# Patient Record
Sex: OTHER/UNKNOWN | Race: BLACK OR AFRICAN AMERICAN | Employment: FULL TIME | URBAN - METROPOLITAN AREA
[De-identification: names, ages, dates, MRNs, and addresses within clinical notes are randomized per-mention and may not be internally consistent; named-entity substitution may affect disease eponyms.]

---

## 2019-10-03 ENCOUNTER — OFFICE VISIT (OUTPATIENT)
Dept: FAMILY MEDICINE CLINIC | Age: 24
End: 2019-10-03

## 2019-10-03 VITALS
HEART RATE: 88 BPM | TEMPERATURE: 98.2 F | DIASTOLIC BLOOD PRESSURE: 72 MMHG | BODY MASS INDEX: 33.46 KG/M2 | HEIGHT: 67 IN | OXYGEN SATURATION: 98 % | RESPIRATION RATE: 18 BRPM | SYSTOLIC BLOOD PRESSURE: 112 MMHG | WEIGHT: 213.2 LBS

## 2019-10-03 DIAGNOSIS — F32.A ANXIETY AND DEPRESSION: ICD-10-CM

## 2019-10-03 DIAGNOSIS — Z76.89 ENCOUNTER TO ESTABLISH CARE WITH NEW DOCTOR: Primary | ICD-10-CM

## 2019-10-03 DIAGNOSIS — F41.9 ANXIETY AND DEPRESSION: ICD-10-CM

## 2019-10-03 DIAGNOSIS — F33.2 SEVERE EPISODE OF RECURRENT MAJOR DEPRESSIVE DISORDER, WITHOUT PSYCHOTIC FEATURES (HCC): ICD-10-CM

## 2019-10-03 DIAGNOSIS — T70.20XA: ICD-10-CM

## 2019-10-03 DIAGNOSIS — R06.2 WHEEZING: ICD-10-CM

## 2019-10-03 RX ORDER — SERTRALINE HYDROCHLORIDE 25 MG/1
TABLET, FILM COATED ORAL DAILY
COMMUNITY
Start: 2019-09-19 | End: 2019-10-03 | Stop reason: SDUPTHER

## 2019-10-03 RX ORDER — SULFAMETHOXAZOLE AND TRIMETHOPRIM 800; 160 MG/1; MG/1
1 TABLET ORAL 2 TIMES DAILY
COMMUNITY
Start: 2019-09-29 | End: 2019-11-14 | Stop reason: ALTCHOICE

## 2019-10-03 RX ORDER — ALBUTEROL SULFATE 90 UG/1
1 AEROSOL, METERED RESPIRATORY (INHALATION)
Qty: 1 INHALER | Refills: 0 | Status: SHIPPED | OUTPATIENT
Start: 2019-10-03 | End: 2019-10-04

## 2019-10-03 RX ORDER — SERTRALINE HYDROCHLORIDE 50 MG/1
50 TABLET, FILM COATED ORAL DAILY
Qty: 90 TAB | Refills: 1 | Status: SHIPPED | OUTPATIENT
Start: 2019-10-03 | End: 2019-11-14 | Stop reason: SDUPTHER

## 2019-10-03 NOTE — PROGRESS NOTES
Patient Name: Alexandre Cintron   MRN: <[id number]>    Nandini Ledesma is a 26 y.o. female who presents with the following: Here to establish care with new PCP. Has been on Zoloft 25 mg daily for both anxiety and depression. States initially helped with depression but she feels the same now. Has not helped her anxiety. Denies any side effects. Denies any active SI/HI. Does not want to start therapy yet. Has noticed a chronic history of feeling short of breath, lightheaded, and wheezing when she exerts herself. She is never been formally diagnosed with asthma. She does recall having breathing issues during childhood when she had URIs. Her mother has asthma. Has had recurrent episodes of nausea and vertigo when she is in high altitude, whether it is flying or hiking in the [facility]. Has tried Dramamine without much effect. Also occasionally has a discomfort along her right ear during the situations. Review of Systems   Constitutional: Negative for chills, fever, malaise/fatigue and weight loss. HENT: Negative for hearing loss, nosebleeds and sore throat. Respiratory: Positive for shortness of breath and wheezing. Negative for cough, hemoptysis and sputum production. Cardiovascular: Negative for chest pain, palpitations, leg swelling and PND. Gastrointestinal: Negative for abdominal pain, blood in stool, constipation, diarrhea, nausea and vomiting. Genitourinary: Negative for dysuria, frequency and urgency. Musculoskeletal: Negative for back pain, falls, joint pain, myalgias and neck pain. Skin: Negative for itching and rash. Neurological: Negative for dizziness, sensory change, focal weakness and loss of consciousness. Psychiatric/Behavioral: Positive for depression. Negative for suicidal ideas. The patient is nervous/anxious. All other systems reviewed and are negative.       The patient's medications, allergies, past medical history, surgical history, family history and social history were reviewed and updated where appropriate. Prior to Admission medications    Medication Sig Start Date End Date Taking? Authorizing Provider   sertraline (ZOLOFT) 25 mg tablet Take  by mouth daily. 9/19/19  Yes Provider, Historical   trimethoprim-sulfamethoxazole (BACTRIM DS, SEPTRA DS) 160-800 mg per tablet Take 1 Tab by mouth two (2) times a day. 9/29/19  Yes Provider, Historical       Allergies   Allergen Reactions    Penicillins Other (comments)     Not sure about the reaction, it runs in the family          Past Medical History:   Diagnosis Date    Anxiety and depression        History reviewed. No pertinent surgical history.     Family History   Problem Relation Age of Onset    No Known Problems Mother     No Known Problems Father     Breast Cancer Paternal Aunt        Social History     Socioeconomic History    Marital status: SINGLE     Spouse name: Not on file    Number of children: Not on file    Years of education: Not on file    Highest education level: Not on file   Occupational History    Not on file   Social Needs    Financial resource strain: Not on file    Food insecurity:     Worry: Not on file     Inability: Not on file    Transportation needs:     Medical: Not on file     Non-medical: Not on file   Tobacco Use    Smoking status: Never Smoker    Smokeless tobacco: Never Used   Substance and Sexual Activity    Alcohol use: Yes     Comment: about 2 a month    Drug use: Not Currently    Sexual activity: Yes     Partners: Female   Lifestyle    Physical activity:     Days per week: Not on file     Minutes per session: Not on file    Stress: Not on file   Relationships    Social connections:     Talks on phone: Not on file     Gets together: Not on file     Attends Jehovah's witness service: Not on file     Active member of club or organization: Not on file     Attends meetings of clubs or organizations: Not on file     Relationship status: Not on file   Ruben Intimate partner violence:     Fear of current or ex partner: Not on file     Emotionally abused: Not on file     Physically abused: Not on file     Forced sexual activity: Not on file   Other Topics Concern    Not on file   Social History Narrative    Not on file         OBJECTIVE    Visit Vitals  /72 (BP 1 Location: Right arm, BP Patient Position: Sitting)   Pulse 88   Temp 98.2 °F (36.8 °C) (Oral)   Resp 18   Ht 5' 7\" (1.702 m)   Wt 213 lb 3.2 oz (96.7 kg)   LMP 09/04/2019 (Exact Date)   SpO2 98%   BMI 33.39 kg/m²       Physical Exam   Constitutional: She is oriented to person, place, and time and well-developed, well-nourished, and in no distress. No distress. HENT:   Head: Normocephalic and atraumatic. Right Ear: External ear normal. Tympanic membrane is not perforated and not erythematous. No middle ear effusion. No decreased hearing is noted. Left Ear: External ear normal. Tympanic membrane is not perforated and not erythematous. No middle ear effusion. No decreased hearing is noted. Nose: Nose normal. Right sinus exhibits no maxillary sinus tenderness and no frontal sinus tenderness. Left sinus exhibits no maxillary sinus tenderness and no frontal sinus tenderness. Mouth/Throat: Uvula is midline, oropharynx is clear and moist and mucous membranes are normal.   Eyes: Pupils are equal, round, and reactive to light. Conjunctivae and EOM are normal.   Neck: Normal range of motion. Neck supple. Cardiovascular: Normal rate, regular rhythm, normal heart sounds and intact distal pulses. Exam reveals no gallop and no friction rub. No murmur heard. Pulmonary/Chest: Effort normal and breath sounds normal. No respiratory distress. She has no wheezes. She has no rales. Abdominal: Soft. Bowel sounds are normal. She exhibits no distension. There is no tenderness. There is no rebound and no guarding. Lymphadenopathy:     She has no cervical adenopathy.    Neurological: She is alert and oriented to person, place, and time. Skin: Skin is warm and dry. She is not diaphoretic. Psychiatric: Mood, memory, affect and judgment normal.   Nursing note and vitals reviewed. ASSESSMENT AND PLAN  John Guzmán is a 26 y.o. female who presents today for:    1. Encounter to establish care with new doctor    2. Anxiety and depression  Increase Zoloft to 50 mg daily. - sertraline (ZOLOFT) 50 mg tablet; Take 1 Tab by mouth daily. Dispense: 90 Tab; Refill: 1    3. Wheezing  Possible exercise induced asthma. Will trial with albuterol.  - albuterol (PROVENTIL HFA, VENTOLIN HFA, PROAIR HFA) 90 mcg/actuation inhaler; Take 1 Puff by inhalation every six (6) hours as needed for Wheezing. Dispense: 1 Inhaler; Refill: 0    4. Unspecified effects of high altitude, initial encounter  Could try prophylactic Afrin prior to flights/hiking. Medications Discontinued During This Encounter   Medication Reason    sertraline (ZOLOFT) 25 mg tablet Reorder       Follow-up and Dispositions    · Return in about 6 weeks (around 11/14/2019). Medication risks/benefits/costs/interactions/alternatives discussed with patient. Advised patient to call back or return to office if symptoms worsen/change/persist. If patient cannot reach us or should anything more severe/urgent arise he/she should proceed directly to the nearest emergency department. Discussed expected course/resolution/complications of diagnosis in detail with patient. Patient given a written after visit summary which includes his/her diagnoses, current medications and vitals. Patient expressed understanding with the diagnosis and plan. Yvonne New M.D.

## 2019-10-03 NOTE — PROGRESS NOTES
Chief Complaint   Patient presents with    New Patient     has never had a pap    Depression    Anxiety    Asthma     trouble breathing from time to time, has passed out in the passed due to shortness of breath     1. Have you been to the ER, urgent care clinic since your last visit? Hospitalized since your last visit? Yes, patient went to [facility] clinic due to UTI a few days ago. 2. Have you seen or consulted any other health care providers outside of the [address] [place] [address] since your last visit? Include any pap smears or colon screening.  No

## 2019-10-03 NOTE — ASSESSMENT & PLAN NOTE
Stable, based on history, physical exam and review of pertinent labs, studies and medications; meds reconciled; continue current treatment plan. Key Psychotherapeutic Meds             sertraline (ZOLOFT) 50 mg tablet (Taking) Take 1 Tab by mouth daily. Other [address] [place] [address]     The patient is on no other behavioral health meds.         No results found for: NA, NAPOC, CREA, CREAPOC, CREATEXT, TSH, WBC, WBCT, WBCPOC, GPT, ALTPOC, ALT, SGOT, ASTPOC, GGT, LITHM, ATRPA, NORTR, TSHEXT

## 2019-10-03 NOTE — PATIENT INSTRUCTIONS

## 2019-11-14 ENCOUNTER — OFFICE VISIT (OUTPATIENT)
Dept: FAMILY MEDICINE CLINIC | Age: 24
End: 2019-11-14

## 2019-11-14 VITALS
WEIGHT: 216 LBS | RESPIRATION RATE: 16 BRPM | OXYGEN SATURATION: 98 % | BODY MASS INDEX: 33.9 KG/M2 | TEMPERATURE: 98.1 F | DIASTOLIC BLOOD PRESSURE: 68 MMHG | HEIGHT: 67 IN | SYSTOLIC BLOOD PRESSURE: 120 MMHG | HEART RATE: 74 BPM

## 2019-11-14 DIAGNOSIS — F41.9 ANXIETY AND DEPRESSION: ICD-10-CM

## 2019-11-14 DIAGNOSIS — F32.A ANXIETY AND DEPRESSION: ICD-10-CM

## 2019-11-14 RX ORDER — SERTRALINE HYDROCHLORIDE 100 MG/1
100 TABLET, FILM COATED ORAL DAILY
Qty: 60 TAB | Refills: 1 | Status: SHIPPED | OUTPATIENT
Start: 2019-11-14 | End: 2020-03-22 | Stop reason: SDUPTHER

## 2019-11-14 NOTE — PROGRESS NOTES
Patient Name: Adrienne Mathis   MRN: <[id number]>    Evan Carpenter is a 22 y.o. female who presents with the following:     Since last visit, increased Zoloft to 50 mg daily. States that she has not noticed much of an improvement but denies any side effects. Review of Systems   Constitutional: Negative for fever, malaise/fatigue and weight loss. Respiratory: Negative for cough, hemoptysis, shortness of breath and wheezing. Cardiovascular: Negative for chest pain, palpitations, leg swelling and PND. Gastrointestinal: Negative for abdominal pain, constipation, diarrhea, nausea and vomiting. Psychiatric/Behavioral: Positive for depression. Negative for suicidal ideas. The patient is nervous/anxious. The patient's medications, allergies, past medical history, surgical history, family history and social history were reviewed and updated where appropriate. Prior to Admission medications    Medication Sig Start Date End Date Taking? Authorizing Provider   albuterol (PROVENTIL HFA) 90 mcg/actuation inhaler Take 2 Puffs by inhalation every six (6) hours as needed for Wheezing. 10/4/19  Yes Rosina Crystal MD   sertraline (ZOLOFT) 50 mg tablet Take 1 Tab by mouth daily. 10/3/19  Yes Rosina Crystal MD   trimethoprim-sulfamethoxazole (BACTRIM DS, SEPTRA DS) 160-800 mg per tablet Take 1 Tab by mouth two (2) times a day. 9/29/19 11/14/19  Provider, Historical       Allergies   Allergen Reactions    Penicillins Other (comments)     Not sure about the reaction, it runs in the family        OBJECTIVE    Visit Vitals  /68 (BP 1 Location: Right arm, BP Patient Position: Sitting)   Pulse 74   Temp 98.1 °F (36.7 °C) (Oral)   Resp 16   Ht 5' 7\" (1.702 m)   Wt 216 lb (98 kg)   LMP 11/05/2019 (Exact Date)   SpO2 98%   BMI 33.83 kg/m²       Physical Exam   Constitutional: She is oriented to person, place, and time and well-developed, well-nourished, and in no distress. No distress.    Eyes: Pupils are equal, round, and reactive to light. Conjunctivae and EOM are normal.   Musculoskeletal: Normal range of motion. Neurological: She is alert and oriented to person, place, and time. Gait normal.   Skin: Skin is warm and dry. She is not diaphoretic. Psychiatric: Mood, memory, affect and judgment normal.   Nursing note and vitals reviewed. ASSESSMENT AND PLAN  Justus Tavares is a 22 y.o. female who presents today for:    1. Anxiety and depression  Increase to 100 mg daily. - sertraline (ZOLOFT) 100 mg tablet; Take 1 Tab by mouth daily. Dose change  Dispense: 60 Tab; Refill: 1       Medications Discontinued During This Encounter   Medication Reason    trimethoprim-sulfamethoxazole (BACTRIM DS, SEPTRA DS) 160-800 mg per tablet Therapy Completed    sertraline (ZOLOFT) 50 mg tablet Reorder       Follow-up and Dispositions    · Return in about 6 weeks (around 12/26/2019) for Medication Check. Medication risks/benefits/costs/interactions/alternatives discussed with patient. Advised patient to call back or return to office if symptoms worsen/change/persist. If patient cannot reach us or should anything more severe/urgent arise he/she should proceed directly to the nearest emergency department. Discussed expected course/resolution/complications of diagnosis in detail with patient. Patient given a written after visit summary which includes his/her diagnoses, current medications and vitals. Patient expressed understanding with the diagnosis and plan. Yvonne Acosta M.D.

## 2019-11-14 NOTE — PROGRESS NOTES
Chief Complaint   Patient presents with    Anxiety     zoloft - it helped at first but it is not helping anymore     1. Have you been to the ER, urgent care clinic since your last visit? Hospitalized since your last visit? No    2. Have you seen or consulted any other health care providers outside of the [address] [place] [address] since your last visit? Include any pap smears or colon screening.  No

## 2020-03-19 ENCOUNTER — PATIENT MESSAGE (OUTPATIENT)
Dept: FAMILY MEDICINE CLINIC | Age: 25
End: 2020-03-19

## 2020-03-19 NOTE — LETTER
3/20/2020 10:54 AM 
 
Ms. Alma Toure 
[address] [place] [address] To Whom It May Concern: Alma Toure is currently under the care of [facility]. She has a history of asthma. Please consider excusing her from work duties given the current oronavirus outbreak. If there are questions or concerns please have the patient contact our office. Sincerely, Kieran Man MD

## 2020-03-19 NOTE — TELEPHONE ENCOUNTER
From: Zuly Andersen  To: Amanda Malik MD  Sent: 3/19/2020 3:20 PM EDT  [facility] [place] [facility] Manny Bello,     I wanted to see if I could get a doctors note to stay home from work for a couple weeks because of the virus. Currently my job ([employer]) doesnt know if we will be closing but advised anyone that has immune or respiratory issues to stay home. And because of my asthma Im a little concerned. I hope to hear from you soon!      Thanks,   Zuly

## 2020-04-07 ENCOUNTER — DOCUMENTATION ONLY (OUTPATIENT)
Dept: FAMILY MEDICINE CLINIC | Age: 25
End: 2020-04-07

## 2020-04-07 NOTE — PROGRESS NOTES
Called and left a message for this patient regarding setting up a Virtual therapy session. Will await a response back.   Tameka Avalos LCSW
Have You Had Previous Treatments With Pdt Before?: has not had previous treatments
When Outside In The Sun, Do You...: mostly burns, rarely tans

## 2020-08-19 ENCOUNTER — HOSPITAL ENCOUNTER (EMERGENCY)
Age: 25
Discharge: HOME | End: 2020-08-19
Attending: EMERGENCY MEDICINE
Payer: COMMERCIAL

## 2020-08-19 VITALS
HEART RATE: 85 BPM | OXYGEN SATURATION: 100 % | SYSTOLIC BLOOD PRESSURE: 97 MMHG | WEIGHT: 245.15 LBS | BODY MASS INDEX: 38.4 KG/M2 | RESPIRATION RATE: 16 BRPM | TEMPERATURE: 97.8 F | DIASTOLIC BLOOD PRESSURE: 65 MMHG
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
## 2020-08-19 DIAGNOSIS — R04.0 EPISTAXIS, RECURRENT: Primary | ICD-10-CM

## 2020-08-19 DIAGNOSIS — S02.2XXA CLOSED FRACTURE OF NASAL BONE, INITIAL ENCOUNTER: ICD-10-CM

## 2020-08-19 PROCEDURE — 99282 EMERGENCY DEPT VISIT SF MDM: CPT
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
## 2020-12-08 ENCOUNTER — VIRTUAL VISIT (OUTPATIENT)
Dept: FAMILY MEDICINE CLINIC | Age: 25
End: 2020-12-08
Payer: COMMERCIAL

## 2020-12-08 DIAGNOSIS — R41.840 ATTENTION DEFICIT: ICD-10-CM

## 2020-12-08 DIAGNOSIS — F41.9 ANXIETY AND DEPRESSION: Primary | ICD-10-CM

## 2020-12-08 DIAGNOSIS — F32.A ANXIETY AND DEPRESSION: Primary | ICD-10-CM

## 2020-12-08 PROCEDURE — 99213 OFFICE O/P EST LOW 20 MIN: CPT | Performed by: FAMILY MEDICINE

## 2020-12-08 RX ORDER — BUPROPION HYDROCHLORIDE 150 MG/1
150 TABLET ORAL
Qty: 30 TAB | Refills: 2 | Status: SHIPPED | OUTPATIENT
Start: 2020-12-08 | End: 2021-03-01

## 2020-12-08 RX ORDER — SERTRALINE HYDROCHLORIDE 100 MG/1
TABLET, FILM COATED ORAL
Qty: 90 TAB | Refills: 1 | Status: SHIPPED | OUTPATIENT
Start: 2020-12-08

## 2020-12-08 NOTE — PROGRESS NOTES
Yoig Amaya, who was evaluated through a synchronous (real-time) audio-video encounter, and/or her healthcare decision maker, is aware that it is a billable service, with coverage as determined by her insurance carrier. She provided verbal consent to proceed: YES, and patient identification was verified. It was conducted pursuant to the emergency declaration under the [facility], [facility] authority and the [employer] and [employer] Act. A caregiver was present when appropriate. Ability to conduct physical exam was limited. I was at home. The patient was at home. This virtual visit was conducted via [employer]. Pursuant to the emergency declaration under the [facility], [address] [place] [address] authority and the [employer] and [employer] Act, this Virtual  Visit was conducted to reduce the patient's risk of exposure to COVID-19 and provide continuity of care for an established patient. Services were provided through a video synchronous discussion virtually to substitute for in-person clinic visit. Due to this being a TeleHealth evaluation, many elements of the physical examination are unable to be assessed. Total Time: minutes: 5-10 minutes. Dianna Khan MD    089  Subjective:   Yogi Amaya was seen for:    Reports increased anxiety despite Zoloft 100 mg daily. Also reports increased weight, possibly 2 to 3 clothing sizes over few months; attributes to sedentary lifestyle but noticed once she started Zoloft. Has had lifelong issues with attention and concentration. Affected her school so much she had to be home schooled. Is wondering if she has ADHD. Prior to Admission medications    Medication Sig Start Date End Date Taking?  Authorizing Provider   sertraline (ZOLOFT) 100 mg tablet Take 1 tablet by mouth once daily 11/3/20   Inez Reyna MD albuterol (PROVENTIL HFA) 90 mcg/actuation inhaler Take 2 Puffs by inhalation every six (6) hours as needed for Wheezing. 10/4/19   Tremaine Knight MD       Allergies   Allergen Reactions    Penicillins Other (comments)     Not sure about the reaction, it runs in the family        Review of Systems   Constitutional: Negative for fever, malaise/fatigue and weight loss. Respiratory: Negative for cough, hemoptysis, shortness of breath and wheezing. Cardiovascular: Negative for chest pain, palpitations, leg swelling and PND. Gastrointestinal: Negative for abdominal pain, constipation, diarrhea, nausea and vomiting. Psychiatric/Behavioral: The patient is nervous/anxious. Physical Exam:     There were no vitals taken for this visit. General: alert, cooperative, no distress   Mental  status: normal mood, behavior, speech, dress, motor activity, and thought processes, able to follow commands   HENT: NCAT   Neck: no visualized mass   Resp: no respiratory distress   Neuro: no gross deficits   Skin: no discoloration or lesions of concern on visible areas   Psychiatric: normal affect, consistent with stated mood, no evidence of hallucinations       Assessment & Plan:     Buzz Lucas is a 26 y.o. female who presents today for:    1. Anxiety and depression  Will add on Wellbutrin to see if it helps with anxiety/attention.  - buPROPion XL (WELLBUTRIN XL) 150 mg tablet; Take 1 Tab by mouth every morning. Dispense: 30 Tab; Refill: 2  - sertraline (ZOLOFT) 100 mg tablet; Take 1 tablet by mouth once daily  Dispense: 90 Tab; Refill: 1    2. Attention deficit  Trial with Wellbutrin. Give psychiatry resources for neurospych testing/stimulant medication management if needed. Medications Discontinued During This Encounter   Medication Reason    sertraline (ZOLOFT) 100 mg tablet REORDER          Follow-up and Dispositions    · Return in about 6 weeks (around 1/19/2021) for Medication Check.        Treatment risks/benefits/costs/interactions/alternatives discussed with patient. Advised patient to call back or return to office if symptoms worsen/change/persist. If patient cannot reach us or should anything more severe/urgent arise he/she should proceed directly to the nearest emergency department. Discussed expected course/resolution/complications of diagnosis in detail with patient. Patient expressed understanding with the diagnosis and plan. Aivi N. Caleb Severe M.D.

## 2021-01-11 NOTE — TELEPHONE ENCOUNTER
MD Fortino Simons,    Call from [address] [place] [address] requesting 30 d/s of albuterol inhaler for patient. Corinne Beard    Last Visit: VV 12/8/20  MD Fortino Simons  Next Appointment: Not scheduled  Previous Refill Encounter(s): 10/4/19 1    Requested Prescriptions     Pending Prescriptions Disp Refills    albuterol (Proventil HFA) 90 mcg/actuation inhaler 1 Inhaler 1     Sig: Take 2 Puffs by inhalation every six (6) hours as needed for Wheezing.

## 2021-01-12 RX ORDER — ALBUTEROL SULFATE 90 UG/1
2 AEROSOL, METERED RESPIRATORY (INHALATION)
Qty: 1 INHALER | Refills: 1 | Status: SHIPPED | OUTPATIENT
Start: 2021-01-12 | End: 2021-05-10

## 2021-04-09 ENCOUNTER — APPOINTMENT (OUTPATIENT)
Dept: GENERAL RADIOLOGY | Age: 26
End: 2021-04-09
Attending: EMERGENCY MEDICINE
Payer: COMMERCIAL

## 2021-04-09 ENCOUNTER — HOSPITAL ENCOUNTER (EMERGENCY)
Age: 26
Discharge: HOME | End: 2021-04-09
Attending: EMERGENCY MEDICINE
Payer: COMMERCIAL

## 2021-04-09 VITALS
DIASTOLIC BLOOD PRESSURE: 87 MMHG | OXYGEN SATURATION: 97 % | SYSTOLIC BLOOD PRESSURE: 121 MMHG | RESPIRATION RATE: 16 BRPM | HEART RATE: 89 BPM | TEMPERATURE: 97.6 F

## 2021-04-09 DIAGNOSIS — M25.572 ACUTE LEFT ANKLE PAIN: Primary | ICD-10-CM

## 2021-04-09 PROCEDURE — 73610 X-RAY EXAM OF ANKLE: CPT

## 2021-04-09 PROCEDURE — 73630 X-RAY EXAM OF FOOT: CPT

## 2021-04-09 PROCEDURE — 99283 EMERGENCY DEPT VISIT LOW MDM: CPT

## 2021-04-09 NOTE — ED NOTES
Dr. Amy Kong reviewed discharge instructions with the patient. The patient verbalized understanding. Patient is in no apparent distress.

## 2021-04-09 NOTE — ED TRIAGE NOTES
Patient presents to the emergency department reporting left ankle pain. Patient reports she fell a while ago, and got checked out at that time. She arrives with an ankle brace in place. Patient states the pain is \"flaring up\". Patient denies new injury.

## 2021-04-09 NOTE — ED PROVIDER NOTES
HPI       26y F here with diffuse ankle and foot pain. Says she had an injury about 6 months ago. Had to wear an ankle splint that she started to wear again in the past few days due to pain. No new injury. No fever. No rash. No swelling. Pain is diffuse. Took tylenol without relief. Past Medical History:   Diagnosis Date    Anxiety and depression     Asthma        History reviewed. No pertinent surgical history.       Family History:   Problem Relation Age of Onset    No Known Problems Mother     No Known Problems Father     Breast Cancer Paternal Aunt        Social History     Socioeconomic History    Marital status: SINGLE     Spouse name: Not on file    Number of children: Not on file    Years of education: Not on file    Highest education level: Not on file   Occupational History    Not on file   Social Needs    Financial resource strain: Not on file    Food insecurity     Worry: Not on file     Inability: Not on file    Transportation needs     Medical: Not on file     Non-medical: Not on file   Tobacco Use    Smoking status: Never Smoker    Smokeless tobacco: Never Used   Substance and Sexual Activity    Alcohol use: Yes     Comment: once a week    Drug use: Not Currently    Sexual activity: Yes     Partners: Female   Lifestyle    Physical activity     Days per week: Not on file     Minutes per session: Not on file    Stress: Not on file   Relationships    Social connections     Talks on phone: Not on file     Gets together: Not on file     Attends [employer] service: Not on file     Active member of club or organization: Not on file     Attends meetings of clubs or organizations: Not on file     Relationship status: Not on file    Intimate partner violence     Fear of current or ex partner: Not on file     Emotionally abused: Not on file     Physically abused: Not on file     Forced sexual activity: Not on file   Other Topics Concern    Not on file   Social History Narrative    Not on file         ALLERGIES: Penicillins    Review of Systems   Review of Systems   Constitutional: (-) weight loss. HEENT: (-) stiff neck   Eyes: (-) discharge. Respiratory: (-) cough. Cardiovascular: (-) syncope. Gastrointestinal: (-) blood in stool. Genitourinary: (-) hematuria. Musculoskeletal: (-) myalgias. Neurological: (-) seizure. Skin: (-) petechiae  Lymph/Immunologic: (-) enlarged lymph nodes  All other systems reviewed and are negative. Vitals:    04/09/21 1817   BP: 137/85   Pulse: 89   Resp: 16   Temp: 97.6 °F (36.4 °C)   SpO2: 99%            Physical Exam Nursing note and vitals reviewed. Constitutional: oriented to person, place, and time. appears well-developed and well-nourished. No distress. Head: Normocephalic and atraumatic. Nose: No rhinorrhea  Mouth/Throat: Oropharynx is clear and moist.  Eyes: Conjunctivae are normal.  Neck: Painless normal range of motion. Cardiovascular: Normal rate  Pulmonary/Chest:  No respiratory distress  Extremities/Musculoskeletal: Normal range of motion. Diffuse tenderness of bilat ankle and prox ant foot. No swelling. No redness or warmth. Distal extremities are neurovasc intact. Neurological:  Alert and oriented to person, place, and time. Coordination normal. CN 2-12 intact. Motor and sensory function intact. Skin: Skin is warm and dry. No rash noted. No pallor. MDM 26y F here with foot/ankle pain. Atraumatic. Will check xrays. Likely supportive care with ortho follow-up.        Procedures

## 2021-05-10 RX ORDER — ALBUTEROL SULFATE 90 UG/1
AEROSOL, METERED RESPIRATORY (INHALATION)
Qty: 6.7 G | Refills: 0 | Status: SHIPPED | OUTPATIENT
Start: 2021-05-10

## 2022-03-20 PROBLEM — F33.2 SEVERE EPISODE OF RECURRENT MAJOR DEPRESSIVE DISORDER, WITHOUT PSYCHOTIC FEATURES (HCC): Status: ACTIVE | Noted: 2019-10-03

## 2023-05-17 RX ORDER — SERTRALINE HYDROCHLORIDE 100 MG/1
1 TABLET, FILM COATED ORAL DAILY
COMMUNITY
Start: 2020-12-08

## 2023-05-17 RX ORDER — ALBUTEROL SULFATE 90 UG/1
2 AEROSOL, METERED RESPIRATORY (INHALATION) EVERY 6 HOURS PRN
COMMUNITY
Start: 2021-05-10

## 2023-05-17 RX ORDER — BUPROPION HYDROCHLORIDE 150 MG/1
TABLET ORAL
COMMUNITY
Start: 2021-08-05